# Patient Record
Sex: FEMALE | NOT HISPANIC OR LATINO
[De-identification: names, ages, dates, MRNs, and addresses within clinical notes are randomized per-mention and may not be internally consistent; named-entity substitution may affect disease eponyms.]

---

## 2017-12-04 ENCOUNTER — LAB SERVICES (OUTPATIENT)
Dept: OTHER | Age: 82
End: 2017-12-04

## 2017-12-04 LAB
APPEARANCE: ABNORMAL
BACTERIA: ABNORMAL /HPF
BILIRUBIN: ABNORMAL
COLOR: YELLOW
GLUCOSE, URINE, AUTOMATED: ABNORMAL MG/DL
KETONES, URINE, AUTOMATED: 5 MG/DL
LEUKOCYTE, URINE, AUTOMATED: ABNORMAL
NITRITE, URINE AUTOMATED: NEGATIVE
PH, URINE: 6 (ref 5–8)
PROTEIN, URINE: ABNORMAL MG/DL
RBC: ABNORMAL /HPF (ref 0–2)
SPECIFIC GRAVITY UA: 1.01 (ref 1–1.03)
SQUAMOUS EPITHELIAL: ABNORMAL /LPF
URINE, BLOOD, AUTOMATED: ABNORMAL
UROBILINOGEN, URINE, AUTOMATED: ABNORMAL MG/DL
WBC: ABNORMAL /HPF (ref 0–5)

## 2017-12-27 ENCOUNTER — IMAGING SERVICES (OUTPATIENT)
Dept: OTHER | Age: 82
End: 2017-12-27

## 2017-12-27 ENCOUNTER — CHARTING TRANS (OUTPATIENT)
Dept: OTHER | Age: 82
End: 2017-12-27

## 2018-01-24 ENCOUNTER — IMAGING SERVICES (OUTPATIENT)
Dept: OTHER | Age: 83
End: 2018-01-24

## 2018-01-24 ENCOUNTER — CHARTING TRANS (OUTPATIENT)
Dept: OTHER | Age: 83
End: 2018-01-24

## 2019-03-06 VITALS — BODY MASS INDEX: 29.88 KG/M2 | WEIGHT: 175 LBS | HEIGHT: 64 IN

## 2019-11-01 RX ORDER — COVID-19 ANTIGEN TEST
220 KIT MISCELLANEOUS DAILY PRN
COMMUNITY

## 2019-11-01 RX ORDER — ENALAPRIL MALEATE 5 MG/1
5 TABLET ORAL DAILY
COMMUNITY

## 2019-11-01 RX ORDER — OMEPRAZOLE 40 MG/1
40 CAPSULE, DELAYED RELEASE ORAL DAILY
COMMUNITY

## 2019-11-04 ENCOUNTER — ANESTHESIA EVENT (OUTPATIENT)
Dept: SURGERY | Facility: HOSPITAL | Age: 84
End: 2019-11-04
Payer: MEDICARE

## 2019-11-04 ENCOUNTER — ANESTHESIA (OUTPATIENT)
Dept: SURGERY | Facility: HOSPITAL | Age: 84
End: 2019-11-04
Payer: MEDICARE

## 2019-11-04 ENCOUNTER — HOSPITAL ENCOUNTER (OUTPATIENT)
Facility: HOSPITAL | Age: 84
Setting detail: HOSPITAL OUTPATIENT SURGERY
Discharge: HOME | End: 2019-11-04
Attending: OPHTHALMOLOGY | Admitting: OPHTHALMOLOGY
Payer: MEDICARE

## 2019-11-04 VITALS
TEMPERATURE: 97 F | RESPIRATION RATE: 16 BRPM | SYSTOLIC BLOOD PRESSURE: 157 MMHG | HEIGHT: 63 IN | WEIGHT: 165 LBS | HEART RATE: 59 BPM | BODY MASS INDEX: 29.23 KG/M2 | DIASTOLIC BLOOD PRESSURE: 60 MMHG | OXYGEN SATURATION: 97 %

## 2019-11-04 PROCEDURE — 08NF3ZZ RELEASE LEFT RETINA, PERCUTANEOUS APPROACH: ICD-10-PCS | Performed by: OPHTHALMOLOGY

## 2019-11-04 PROCEDURE — 08B53ZZ EXCISION OF LEFT VITREOUS, PERCUTANEOUS APPROACH: ICD-10-PCS | Performed by: OPHTHALMOLOGY

## 2019-11-04 RX ORDER — KETAMINE HYDROCHLORIDE 50 MG/ML
INJECTION, SOLUTION, CONCENTRATE INTRAMUSCULAR; INTRAVENOUS AS NEEDED
Status: DISCONTINUED | OUTPATIENT
Start: 2019-11-04 | End: 2019-11-04 | Stop reason: SURG

## 2019-11-04 RX ORDER — ONDANSETRON 2 MG/ML
4 INJECTION INTRAMUSCULAR; INTRAVENOUS ONCE AS NEEDED
Status: DISCONTINUED | OUTPATIENT
Start: 2019-11-04 | End: 2019-11-04

## 2019-11-04 RX ORDER — CIPROFLOXACIN HYDROCHLORIDE 3.5 MG/ML
1 SOLUTION/ DROPS TOPICAL
Status: COMPLETED | OUTPATIENT
Start: 2019-11-04 | End: 2019-11-04

## 2019-11-04 RX ORDER — SODIUM CHLORIDE, SODIUM LACTATE, POTASSIUM CHLORIDE, CALCIUM CHLORIDE 600; 310; 30; 20 MG/100ML; MG/100ML; MG/100ML; MG/100ML
INJECTION, SOLUTION INTRAVENOUS CONTINUOUS
Status: DISCONTINUED | OUTPATIENT
Start: 2019-11-04 | End: 2019-11-04

## 2019-11-04 RX ORDER — TROPICAMIDE 10 MG/ML
2 SOLUTION/ DROPS OPHTHALMIC
Status: COMPLETED | OUTPATIENT
Start: 2019-11-04 | End: 2019-11-04

## 2019-11-04 RX ORDER — HALOPERIDOL 5 MG/ML
0.25 INJECTION INTRAMUSCULAR ONCE AS NEEDED
Status: DISCONTINUED | OUTPATIENT
Start: 2019-11-04 | End: 2019-11-04

## 2019-11-04 RX ORDER — HYDROMORPHONE HYDROCHLORIDE 1 MG/ML
0.6 INJECTION, SOLUTION INTRAMUSCULAR; INTRAVENOUS; SUBCUTANEOUS EVERY 5 MIN PRN
Status: DISCONTINUED | OUTPATIENT
Start: 2019-11-04 | End: 2019-11-04

## 2019-11-04 RX ORDER — PROCHLORPERAZINE EDISYLATE 5 MG/ML
5 INJECTION INTRAMUSCULAR; INTRAVENOUS ONCE AS NEEDED
Status: DISCONTINUED | OUTPATIENT
Start: 2019-11-04 | End: 2019-11-04

## 2019-11-04 RX ORDER — FAMOTIDINE 20 MG/1
20 TABLET ORAL ONCE
Status: DISCONTINUED | OUTPATIENT
Start: 2019-11-04 | End: 2019-11-04 | Stop reason: HOSPADM

## 2019-11-04 RX ORDER — DEXAMETHASONE SODIUM PHOSPHATE 4 MG/ML
VIAL (ML) INJECTION AS NEEDED
Status: DISCONTINUED | OUTPATIENT
Start: 2019-11-04 | End: 2019-11-04 | Stop reason: HOSPADM

## 2019-11-04 RX ORDER — MORPHINE SULFATE 4 MG/ML
2 INJECTION, SOLUTION INTRAMUSCULAR; INTRAVENOUS EVERY 10 MIN PRN
Status: DISCONTINUED | OUTPATIENT
Start: 2019-11-04 | End: 2019-11-04

## 2019-11-04 RX ORDER — MORPHINE SULFATE 4 MG/ML
4 INJECTION, SOLUTION INTRAMUSCULAR; INTRAVENOUS EVERY 10 MIN PRN
Status: DISCONTINUED | OUTPATIENT
Start: 2019-11-04 | End: 2019-11-04

## 2019-11-04 RX ORDER — METOCLOPRAMIDE 10 MG/1
10 TABLET ORAL ONCE
Status: DISCONTINUED | OUTPATIENT
Start: 2019-11-04 | End: 2019-11-04 | Stop reason: HOSPADM

## 2019-11-04 RX ORDER — HYDROCODONE BITARTRATE AND ACETAMINOPHEN 5; 325 MG/1; MG/1
2 TABLET ORAL AS NEEDED
Status: DISCONTINUED | OUTPATIENT
Start: 2019-11-04 | End: 2019-11-04

## 2019-11-04 RX ORDER — HYDROMORPHONE HYDROCHLORIDE 1 MG/ML
0.2 INJECTION, SOLUTION INTRAMUSCULAR; INTRAVENOUS; SUBCUTANEOUS EVERY 5 MIN PRN
Status: DISCONTINUED | OUTPATIENT
Start: 2019-11-04 | End: 2019-11-04

## 2019-11-04 RX ORDER — MIDAZOLAM HYDROCHLORIDE 1 MG/ML
INJECTION INTRAMUSCULAR; INTRAVENOUS AS NEEDED
Status: DISCONTINUED | OUTPATIENT
Start: 2019-11-04 | End: 2019-11-04 | Stop reason: SURG

## 2019-11-04 RX ORDER — HYDROCODONE BITARTRATE AND ACETAMINOPHEN 5; 325 MG/1; MG/1
1 TABLET ORAL AS NEEDED
Status: DISCONTINUED | OUTPATIENT
Start: 2019-11-04 | End: 2019-11-04

## 2019-11-04 RX ORDER — ACETAMINOPHEN 500 MG
1000 TABLET ORAL ONCE
Status: COMPLETED | OUTPATIENT
Start: 2019-11-04 | End: 2019-11-04

## 2019-11-04 RX ORDER — TRIAMCINOLONE ACETONIDE 40 MG/ML
INJECTION, SUSPENSION INTRA-ARTICULAR; INTRAMUSCULAR AS NEEDED
Status: DISCONTINUED | OUTPATIENT
Start: 2019-11-04 | End: 2019-11-04 | Stop reason: HOSPADM

## 2019-11-04 RX ORDER — HYDROMORPHONE HYDROCHLORIDE 1 MG/ML
0.4 INJECTION, SOLUTION INTRAMUSCULAR; INTRAVENOUS; SUBCUTANEOUS EVERY 5 MIN PRN
Status: DISCONTINUED | OUTPATIENT
Start: 2019-11-04 | End: 2019-11-04

## 2019-11-04 RX ORDER — BALANCED SALT SOLUTION 6.4; .75; .48; .3; 3.9; 1.7 MG/ML; MG/ML; MG/ML; MG/ML; MG/ML; MG/ML
SOLUTION OPHTHALMIC AS NEEDED
Status: DISCONTINUED | OUTPATIENT
Start: 2019-11-04 | End: 2019-11-04 | Stop reason: HOSPADM

## 2019-11-04 RX ORDER — NALOXONE HYDROCHLORIDE 0.4 MG/ML
80 INJECTION, SOLUTION INTRAMUSCULAR; INTRAVENOUS; SUBCUTANEOUS AS NEEDED
Status: DISCONTINUED | OUTPATIENT
Start: 2019-11-04 | End: 2019-11-04

## 2019-11-04 RX ORDER — MORPHINE SULFATE 10 MG/ML
6 INJECTION, SOLUTION INTRAMUSCULAR; INTRAVENOUS EVERY 10 MIN PRN
Status: DISCONTINUED | OUTPATIENT
Start: 2019-11-04 | End: 2019-11-04

## 2019-11-04 RX ORDER — PHENYLEPHRINE HCL 2.5 %
2 DROPS OPHTHALMIC (EYE)
Status: COMPLETED | OUTPATIENT
Start: 2019-11-04 | End: 2019-11-04

## 2019-11-04 RX ADMIN — MIDAZOLAM HYDROCHLORIDE 2 MG: 1 INJECTION INTRAMUSCULAR; INTRAVENOUS at 09:29:00

## 2019-11-04 RX ADMIN — SODIUM CHLORIDE, SODIUM LACTATE, POTASSIUM CHLORIDE, CALCIUM CHLORIDE: 600; 310; 30; 20 INJECTION, SOLUTION INTRAVENOUS at 10:08:00

## 2019-11-04 RX ADMIN — KETAMINE HYDROCHLORIDE 20 MG: 50 INJECTION, SOLUTION, CONCENTRATE INTRAMUSCULAR; INTRAVENOUS at 09:32:00

## 2019-11-04 NOTE — H&P
History & Physical Examination    Name: Jessica Serrano  Patient ID:  [id number]  Date:  11/4/2019  YOB: 1934 AGE:  80year old SEX:  female      M.D./D.O./D.P.M PERFORMING SURGERY/PROCEDURE:    Reilly Azevedo MD    Diagnosis:  Macular hole os

## 2019-11-04 NOTE — ANESTHESIA PREPROCEDURE EVALUATION
Anesthesia PreOp Note    HPI:     Wei Ritchie is a 80year old female who presents for preoperative consultation requested by:  Jorja Bernheim, MD    Date of Surgery: 11/4/2019    Procedure(s):  EYE VITRECTOMY WITH INDIRECT/ DIRECT LASER/ IRIDEX  Indication file.        Albuterol               JITTERY  Statins                 OTHER (SEE COMMENTS)    Comment:Joint pain    Family History   Problem Relation Age of Onset   • Other (stomach cancer) Father    • Other (liver cancer) Brother      Social History    So her pulse is 80. Her respiration is 16 and oxygen saturation is 96%.     11/01/19  1238 11/04/19  0735   BP:  157/69   Pulse:  80   Resp:  16   Temp:  97.9 °F (36.6 °C)   TempSrc:  Oral   SpO2:  96%   Weight: 74.8 kg (165 lb) 74.8 kg (165 lb)   Height: 1.6

## 2019-11-04 NOTE — ANESTHESIA POSTPROCEDURE EVALUATION
Patient: Krystal Duval    Procedure Summary     Date:  11/04/19 Room / Location:  [facility] OR 03 / [facility] OR    Anesthesia Start:  9027 Anesthesia Stop:  1010    Procedure:  EYE VITRECTOMY WITH INDIRECT/ DIRECT LASER/ IRIDEX (Left ) Diagnosis:  (macular

## 2019-11-04 NOTE — BRIEF OP NOTE
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

## 2019-11-04 NOTE — OPERATIVE REPORT
[facility]    PATIENT'S NAME: Brett Barbosamakayla   ATTENDING PHYSICIAN: Eda Powers MD   OPERATING PHYSICIAN: Eda Powers MD   PATIENT ACCOUNT#:   [de-identified]    LOCATION:  [address]  MEDICAL RECORD #:   [id number]       DATE OF BIRTH:  [birth year] with the overlying epiretinal membrane. Dilute ICG dye was injected into the posterior pole and then immediately aspirated, and additional internal limiting membrane was then carried out in the peripheral macula.   Fluid air exchange was then carried out,

## 2021-01-01 PROCEDURE — 93010 ELECTROCARDIOGRAM REPORT: CPT | Performed by: INTERNAL MEDICINE

## (undated) DEVICE — 6 ML SYRINGE LUER-LOCK TIP: Brand: MONOJECT

## (undated) DEVICE — 25+ TOTAL PLUS VITRECTORY PAK, BEVELED 10,000 CPM ULTRAVIT PROBE STRAIGHT ENDOILLUMINATOR: Brand: CONSTELLATION, EDGE PLUS, TOTAL PLUS, ULTRAVIT

## (undated) DEVICE — SOL H2O 1000ML BTL

## (undated) DEVICE — APPLICATOR COTTON TIP 3 10/PK

## (undated) DEVICE — 1 ML INSULIN SYRINGE,LUER-LOCK WITH TIP CAP: Brand: MONOJECT

## (undated) DEVICE — FILTER FLUID EYE E4429-22

## (undated) DEVICE — 25GA SOFT TIP CANNULA: Brand: SYNERGETICS

## (undated) DEVICE — SOLUTION IRR BTL 250CC NACL

## (undated) DEVICE — SOLUTION IRR BSS+

## (undated) DEVICE — ENCORE® LATEX MICRO SIZE 7.5, STERILE LATEX POWDER-FREE SURGICAL GLOVE: Brand: ENCORE

## (undated) DEVICE — LENS VITRTM FLT DISP

## (undated) DEVICE — RETINAL: Brand: MEDLINE INDUSTRIES, INC.

## (undated) DEVICE — PACK SRG BIOM FULL DRP STRL

## (undated) DEVICE — NEEDLE HPO 30GA .5IN RW REG